# Patient Record
Sex: FEMALE | Race: WHITE
[De-identification: names, ages, dates, MRNs, and addresses within clinical notes are randomized per-mention and may not be internally consistent; named-entity substitution may affect disease eponyms.]

---

## 2019-04-05 ENCOUNTER — HOSPITAL ENCOUNTER (OUTPATIENT)
Dept: HOSPITAL 92 - SCSULT | Age: 44
Discharge: HOME | End: 2019-04-05
Attending: FAMILY MEDICINE
Payer: COMMERCIAL

## 2019-04-05 DIAGNOSIS — R22.1: Primary | ICD-10-CM

## 2019-04-05 PROCEDURE — 76536 US EXAM OF HEAD AND NECK: CPT

## 2019-04-05 NOTE — ULT
FExam: Soft tissue neck ultrasound



HISTORY: Lump. Mass.



COMPARISON: None



TECHNIQUE: Targeted sonographic imaging in the posterior right neck was performed, in the region of c
oncern



FINDINGS: There is a well-circumscribed hypoechoic focus in the region of concern measuring 0.4 x 1.1
 x 1.0 cm. There does appear to be some flow in the central echogenic focus. The possibility of a enl
arged lymph node is raised.



IMPRESSION:

Probably enlarged lymph node in the right neck, corresponding to the palpable focus. Further interrog
ation with a postcontrast soft tissue neck CT is recommended to assess for other enlarged lymph nodes
 as well as to assess for possible malignancies or infectious processes which may have resulted in th
e a forementioned enlarged lymph node.



Reported By: Toro Palm 

Electronically Signed:  4/5/2019 4:05 PM

## 2019-04-26 ENCOUNTER — HOSPITAL ENCOUNTER (OUTPATIENT)
Dept: HOSPITAL 92 - SCSCT | Age: 44
Discharge: HOME | End: 2019-04-26
Attending: FAMILY MEDICINE
Payer: COMMERCIAL

## 2019-04-26 DIAGNOSIS — R22.1: Primary | ICD-10-CM

## 2019-04-26 PROCEDURE — 70491 CT SOFT TISSUE NECK W/DYE: CPT

## 2019-04-26 NOTE — CT
Exam

POSTCONTRAST SOFT TISSUE CT:



COMPARISON: 

None.



CORRELATION:

Right neck ultrasound.



HISTORY: 

Right neck lymphadenopathy, corresponding to a palpable focus. Lymphadenopathy was demonstrated on re
cent ultrasound.



FINDINGS: 

Visualized brain parenchyma is unremarkable.



Bilateral ocular lenses are appropriately located. Both globes are intact. Retrobulbar fat is preserv
ed.  

Symmetric attenuation of the optic nerves and ocular rectus muscles.



Adequate aeration of the sinuses and mastoid air cells.



Aerodigestive tract is patent. No mucosal abnormality. Midline fatty raphae of the tongue is preserve
d.  Epiglottis has a normal caliber. Preepiglottic fat is preserved.



No prevertebral soft tissue swelling.



Cervical spine vertebral body height is maintained. There is no fracture. There is no significant ximena
tral canal stenosis or neural foraminal narrowing. Evaluation is slightly limited by technique.



Grossly, the great vessels of the neck are patent.



Symmetric attenuation of the parotid and submandibular glands. Symmetric attenuation of the sternocle
idomastoid muscles.



Unremarkable thyroid gland.



Unremarkable lung apices and upper mediastinum.



There is no evidence of lymphadenopathy by size criteria. There appears to be a palpable marker in th
e right neck, at the C4 level. There is no evidence of underlying mass, cystic lesion or

lymphadenopathy. Lymph node noted on previous ultrasound is not appreciated on the current examinatio
n.



IMPRESSION: 

Unremarkable postcontrast soft tissue neck CT.



Transcribed Date/Time: 4/26/2019 12:12 PM



Reported By: Toro Palm 

Electronically Signed:  4/26/2019 12:58 PM